# Patient Record
Sex: FEMALE | Race: WHITE
[De-identification: names, ages, dates, MRNs, and addresses within clinical notes are randomized per-mention and may not be internally consistent; named-entity substitution may affect disease eponyms.]

---

## 2018-02-20 ENCOUNTER — HOSPITAL ENCOUNTER (INPATIENT)
Dept: HOSPITAL 92 - L&D/OP | Age: 28
LOS: 2 days | Discharge: HOME | End: 2018-02-22
Attending: OBSTETRICS & GYNECOLOGY | Admitting: OBSTETRICS & GYNECOLOGY
Payer: COMMERCIAL

## 2018-02-20 VITALS — BODY MASS INDEX: 29.7 KG/M2

## 2018-02-20 DIAGNOSIS — Z88.0: ICD-10-CM

## 2018-02-20 DIAGNOSIS — Z3A.39: ICD-10-CM

## 2018-02-20 DIAGNOSIS — F32.9: ICD-10-CM

## 2018-02-20 DIAGNOSIS — F41.9: ICD-10-CM

## 2018-02-20 LAB
HBSAG INDEX: 0.15 S/CO (ref 0–0.99)
HGB BLD-MCNC: 13.9 G/DL (ref 12–16)
MCH RBC QN AUTO: 31.5 PG (ref 27–31)
MCV RBC AUTO: 91.9 FL (ref 81–99)
PLATELET # BLD AUTO: 187 THOU/UL (ref 130–400)
RBC # BLD AUTO: 4.4 MILL/UL (ref 4.2–5.4)
SYPHILIS ANTIBODY INDEX: 0.05 S/CO
WBC # BLD AUTO: 12.7 THOU/UL (ref 4.8–10.8)

## 2018-02-20 PROCEDURE — 86901 BLOOD TYPING SEROLOGIC RH(D): CPT

## 2018-02-20 PROCEDURE — 86780 TREPONEMA PALLIDUM: CPT

## 2018-02-20 PROCEDURE — 85027 COMPLETE CBC AUTOMATED: CPT

## 2018-02-20 PROCEDURE — 0KQM0ZZ REPAIR PERINEUM MUSCLE, OPEN APPROACH: ICD-10-PCS | Performed by: OBSTETRICS & GYNECOLOGY

## 2018-02-20 PROCEDURE — 86850 RBC ANTIBODY SCREEN: CPT

## 2018-02-20 PROCEDURE — 99285 EMERGENCY DEPT VISIT HI MDM: CPT

## 2018-02-20 PROCEDURE — 87340 HEPATITIS B SURFACE AG IA: CPT

## 2018-02-20 PROCEDURE — 86900 BLOOD TYPING SEROLOGIC ABO: CPT

## 2018-02-20 PROCEDURE — 36415 COLL VENOUS BLD VENIPUNCTURE: CPT

## 2018-02-20 PROCEDURE — 51702 INSERT TEMP BLADDER CATH: CPT

## 2018-02-20 RX ADMIN — DOCUSATE CALCIUM SCH MG: 240 CAPSULE, LIQUID FILLED ORAL at 22:20

## 2018-02-20 RX ADMIN — Medication SCH MLS: at 14:45

## 2018-02-20 NOTE — PDOC.LDPN
Labor & Delivery Progress Note





- Subjective


Subjective: painful contractions





- Objective


Vital signs reviewed and normal: yes


General: resting, breathing through contractions


Uterine fundus: non tender


Dilation: 5


Effacement: 90%


Station: -1


FHT: category 1


[place] contractions every: 3


AROM: clear fluid





- Assessment


(1) 39 weeks gestation of pregnancy


Code(s): Z3A.39 - 39 WEEKS GESTATION OF PREGNANCY   Current Visit: Yes   Status

: Acute   





(2) Active labor at term


Code(s): JXX6481 -    Current Visit: Yes   Status: Acute   


Plan: continue plan of care

## 2018-02-20 NOTE — PDOC.LDHP
Labor and Delivery H&P


Chief complaint: contractions


HPI: 





26 yo G1 presents c/o UCs since early this AM.


Current gestational age (weeks): 39


Due date: 18


Dating criteria: last menstrual period, first trimester ultrasound


Grav: 1


Para: 0


OB History Details: 





Uncomplicated PNC with Dr. Mcclellan


Current pregnancy complications: none


Abnormal US findings: No


Past Medical History: 





none


Current medications: pre- vitamins


Previous surgical history: none


Allergies/Adverse Reactions: 


 Allergies











Allergy/AdvReac Type Severity Reaction Status Date / Time


 


Penicillins Allergy   Verified 18 10:44











Social history: none





- Physical Exam


Vital signs reviewed and normal: yes


General: NAD


Heart: RRR


Lungs: CTAB


Abdomen: gravid


Extremeties: trace edema


FHT: category 1


[place] contractions every: q 3-4 mins





- Vaginal Exam


Effacement: 90%





- OB Labs


Blood type: O


RH: positive


Antibody Screen: negative


HIV: negative


RPR: negative


HEPSAg: negative


1 hour GCT: negative


GBS: negative


Rubella: immune





- Assessment





Term IUP in early labor





- Plan


Plan: admit to L&D


-: 





Dr. Mcclellan notified

## 2018-02-21 VITALS — TEMPERATURE: 98.1 F

## 2018-02-21 LAB
HGB BLD-MCNC: 11 G/DL (ref 12–16)
MCH RBC QN AUTO: 31.9 PG (ref 27–31)
MCV RBC AUTO: 92 FL (ref 81–99)
PLATELET # BLD AUTO: 154 THOU/UL (ref 130–400)
RBC # BLD AUTO: 3.45 MILL/UL (ref 4.2–5.4)
WBC # BLD AUTO: 11 THOU/UL (ref 4.8–10.8)

## 2018-02-21 RX ADMIN — DOCUSATE CALCIUM SCH MG: 240 CAPSULE, LIQUID FILLED ORAL at 09:13

## 2018-02-21 RX ADMIN — DOCUSATE CALCIUM SCH MG: 240 CAPSULE, LIQUID FILLED ORAL at 21:30

## 2018-02-21 RX ADMIN — Medication SCH: at 19:18

## 2018-02-21 NOTE — PDOC.PP
Post Partum Progress Note


Post Partum Day #: 1


Subjective: 





doing well, request LC, min lochia and discomfort


PO intake tolerated: yes


Flatus: yes


Ambulation: yes


 Vital Signs (12 hours)











  Temp Pulse Resp BP Pulse Ox


 


 18 08:00  97.6 F  79  18  


 


 18 07:49  97.6 F  79  18  105/59 L  95


 


 18 05:00  98.0 F  71  18  114/55 L 


 


 18 00:00  98.0 F  77  18  113/63 








 Weight











Weight                         173 lb

















- Physical Examination


General: NAD


Respiratory: non-labored breathing


Fundus firm & at: below umb


Extremities: negative homans (B)


Skin: no rash


Neurological: no gross focal deficits


Psychiatric: A&Ox3, normal affect


Result Diagrams: 


 18 05:40





Additional Labs: 


 Post Partum Labs











Blood Type  O POSITIVE   18  12:20    


 


Hep Bs Antigen  Non-Reactive S/CO (NonReactive)   18  12:20    











(1) 39 weeks gestation of pregnancy


Code(s): Z3A.39 - 39 WEEKS GESTATION OF PREGNANCY   Status: Acute   





(2) Active labor at term


Code(s): NBP8587 -    Status: Acute   





- Assessment/Plan





A?P: PPD sp  @ term.  Likely DC tomorrow.

## 2018-02-22 VITALS — DIASTOLIC BLOOD PRESSURE: 64 MMHG | SYSTOLIC BLOOD PRESSURE: 125 MMHG

## 2018-02-22 RX ADMIN — DOCUSATE CALCIUM SCH MG: 240 CAPSULE, LIQUID FILLED ORAL at 08:33

## 2018-02-22 NOTE — PDOC.PP
Post Partum Progress Note


Post Partum Day #: 2


PO intake tolerated: yes


Flatus: yes


Ambulation: yes


 Weight











Weight                         173 lb

















- Physical Examination


General: NAD


Cardiovascular: RRR


Respiratory: non-labored breathing


Abdominal: no distention, appropriately TTP


Fundus firm & at: umb-2


Skin: no rash


Neurological: no gross focal deficits


Psychiatric: normal affect


Result Diagrams: 


 02/21/18 05:40





Additional Labs: 


 Post Partum Labs











Blood Type  O POSITIVE   02/20/18  12:20    


 


Hep Bs Antigen  Non-Reactive S/CO (NonReactive)   02/20/18  12:20    











(1) Vaginal delivery


Code(s): O80 - ENCOUNTER FOR FULL-TERM UNCOMPLICATED DELIVERY   Status: Acute   





- Assessment/Plan





VSSAF


Doing well, no issues PP


Rh pos RImm


Breastfeeding


DC home FU 6 wk

## 2018-02-23 NOTE — DIS
ADMISSION DIAGNOSES:

1.  Intrauterine pregnancy at 39 weeks.

2.  Early labor.

 

DISCHARGE DIAGNOSIS:  Status post term spontaneous vaginal delivery.

 

DISCHARGE CONDITION:  Stable.

 

ATTENDING PHYSICIAN:  Justyna Barrett M.D.

 

CONSULTATIONS:  None.

 

PROCEDURES:  Spontaneous vaginal delivery.

 

HISTORY AND PHYSICAL EXAMINATION:  Please see previously dictated H&P.

 

HOSPITAL COURSE:  A 27-year-old , presented at 39 weeks and was found to be in early labor.  She
 had Pitocin augmentation and went on to deliver a viable male infant weighing 6 pounds 15 ounces wit
h Apgars of 9 and 9 with a second degree laceration repaired.  Postpartum she did well.  She had norm
al lochia.  Vital signs remain within normal limits and the patient was breastfeeding and had a lacta
tion consult in house.  She was Rh positive and rubella immune.  She was discharged home in stable co
ndition on postpartum day #2 and given instructions to follow up in 6 weeks' time.  She has no labs o
r studies pending at the time of discharge.

## 2018-03-13 NOTE — DN
DATE OF DELIVERY:  2018

 

REGULAR OBSTETRICIAN:  Dr. Grayson Mcclellan.

 

DELIVERING PHYSICIAN:  Red Toro M.D.

 

PROCEDURE:  Ms. Tamez is a 27-year-old , now at 39 weeks, who has been admitted in labor.  A s
pontaneous vaginal delivery of a male infant with Apgars 9 and 9 over a small second-degree laceratio
n was performed.  The placenta was delivered intact.  She was repaired with a 2-0 chromic in layers. 
 There was a single loose nuchal cord at the time of delivery.  Both mom and baby did well and will r
ecover in Labor and Delivery.